# Patient Record
Sex: MALE | Race: ASIAN | Employment: STUDENT | URBAN - METROPOLITAN AREA
[De-identification: names, ages, dates, MRNs, and addresses within clinical notes are randomized per-mention and may not be internally consistent; named-entity substitution may affect disease eponyms.]

---
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
## 2024-10-03 NOTE — TELEPHONE ENCOUNTER
Form was dropped off at  (now to [place])  Patient had a sports physical in October of 2019 however he has switched schools. Can a new sports letter be generated off of the information? Mom will  when done.   
Letter created.  
Mother is checking on the status of the request to get the child's sports physical. Please advise  
Please advise.  
Sports letter at  [employer] , Mom notified.   
GOAL: In 2 weeks pt will be independent with all transfers.